# Patient Record
Sex: MALE | Race: WHITE | NOT HISPANIC OR LATINO | Employment: FULL TIME | URBAN - METROPOLITAN AREA
[De-identification: names, ages, dates, MRNs, and addresses within clinical notes are randomized per-mention and may not be internally consistent; named-entity substitution may affect disease eponyms.]

---

## 2019-10-10 ENCOUNTER — TELEPHONE (OUTPATIENT)
Dept: ORTHOPEDICS | Facility: CLINIC | Age: 39
End: 2019-10-10

## 2019-10-10 NOTE — TELEPHONE ENCOUNTER
spoke w/ patient discussing upcoming appt. Patient states that he wanted to be put on waitlist to get an earlier appt. Informed patient that if a slot opened up he would be called to get an earlier appt. Patient verbalized understanding and was grateful for the call. -DD          ----- Message from Lanny Licona sent at 10/10/2019  8:31 AM CDT -----  Contact: Patient  Patient would like a call back at Ph .[phone], to discuss upcoming appointment.

## 2019-10-22 DIAGNOSIS — M25.551 RIGHT HIP PAIN: Primary | ICD-10-CM

## 2019-10-22 DIAGNOSIS — M25.561 RIGHT KNEE PAIN, UNSPECIFIED CHRONICITY: ICD-10-CM

## 2019-10-24 ENCOUNTER — TELEPHONE (OUTPATIENT)
Dept: SPORTS MEDICINE | Facility: CLINIC | Age: 39
End: 2019-10-24

## 2019-10-24 NOTE — TELEPHONE ENCOUNTER
Pt has appt with Dr. Stiles 10-28-19, he will not be available. Pt rescheduled to 11-6-19 knows to come in early for x-rays

## 2019-11-06 ENCOUNTER — HOSPITAL ENCOUNTER (OUTPATIENT)
Dept: RADIOLOGY | Facility: HOSPITAL | Age: 39
Discharge: HOME | End: 2019-11-06
Attending: ORTHOPAEDIC SURGERY
Payer: COMMERCIAL

## 2019-11-06 ENCOUNTER — OFFICE VISIT (OUTPATIENT)
Dept: SPORTS MEDICINE | Facility: CLINIC | Age: 39
End: 2019-11-06
Payer: COMMERCIAL

## 2019-11-06 ENCOUNTER — TELEPHONE (OUTPATIENT)
Dept: ORTHOPEDICS | Facility: CLINIC | Age: 39
End: 2019-11-06

## 2019-11-06 VITALS
BODY MASS INDEX: 29.16 KG/M2 | HEART RATE: 54 BPM | WEIGHT: 220 LBS | DIASTOLIC BLOOD PRESSURE: 76 MMHG | SYSTOLIC BLOOD PRESSURE: 129 MMHG | HEIGHT: 73 IN

## 2019-11-06 DIAGNOSIS — M25.859 FEMORAL ACETABULAR IMPINGEMENT: ICD-10-CM

## 2019-11-06 DIAGNOSIS — M25.561 RIGHT KNEE PAIN, UNSPECIFIED CHRONICITY: ICD-10-CM

## 2019-11-06 DIAGNOSIS — M25.562 LEFT KNEE PAIN, UNSPECIFIED CHRONICITY: Primary | ICD-10-CM

## 2019-11-06 DIAGNOSIS — M25.551 RIGHT HIP PAIN: ICD-10-CM

## 2019-11-06 DIAGNOSIS — Z98.890 HISTORY OF HIP SURGERY: ICD-10-CM

## 2019-11-06 DIAGNOSIS — Z98.890 S/P ARTHROSCOPIC SURGERY OF LEFT KNEE: ICD-10-CM

## 2019-11-06 DIAGNOSIS — S83.242A ACUTE MEDIAL MENISCUS TEAR OF LEFT KNEE, INITIAL ENCOUNTER: ICD-10-CM

## 2019-11-06 PROCEDURE — 73502 XR HIP 2 VIEW RIGHT: ICD-10-PCS | Mod: 26,RT,, | Performed by: RADIOLOGY

## 2019-11-06 PROCEDURE — 99203 OFFICE O/P NEW LOW 30 MIN: CPT | Mod: S$PBB,,, | Performed by: ORTHOPAEDIC SURGERY

## 2019-11-06 PROCEDURE — 73564 X-RAY EXAM KNEE 4 OR MORE: CPT | Mod: 26,LT,, | Performed by: RADIOLOGY

## 2019-11-06 PROCEDURE — 99203 PR OFFICE/OUTPT VISIT, NEW, LEVL III, 30-44 MIN: ICD-10-PCS | Mod: S$PBB,,, | Performed by: ORTHOPAEDIC SURGERY

## 2019-11-06 PROCEDURE — 99999 PR PBB SHADOW E&M-EST. PATIENT-LVL IV: CPT | Mod: PBBFAC,,, | Performed by: ORTHOPAEDIC SURGERY

## 2019-11-06 PROCEDURE — 73562 X-RAY EXAM OF KNEE 3: CPT | Mod: TC,RT

## 2019-11-06 PROCEDURE — 99999 PR PBB SHADOW E&M-EST. PATIENT-LVL IV: ICD-10-PCS | Mod: PBBFAC,,, | Performed by: ORTHOPAEDIC SURGERY

## 2019-11-06 PROCEDURE — 73502 X-RAY EXAM HIP UNI 2-3 VIEWS: CPT | Mod: 26,RT,, | Performed by: RADIOLOGY

## 2019-11-06 PROCEDURE — 73562 X-RAY EXAM OF KNEE 3: CPT | Mod: 26,59,RT, | Performed by: RADIOLOGY

## 2019-11-06 PROCEDURE — 73502 X-RAY EXAM HIP UNI 2-3 VIEWS: CPT | Mod: TC,RT

## 2019-11-06 PROCEDURE — 73564 XR KNEE ORTHO LEFT WITH FLEXION: ICD-10-PCS | Mod: 26,LT,, | Performed by: RADIOLOGY

## 2019-11-06 PROCEDURE — 73562 XR KNEE ORTHO LEFT WITH FLEXION: ICD-10-PCS | Mod: 26,59,RT, | Performed by: RADIOLOGY

## 2019-11-06 PROCEDURE — 99214 OFFICE O/P EST MOD 30 MIN: CPT | Mod: PBBFAC,25 | Performed by: ORTHOPAEDIC SURGERY

## 2019-11-06 NOTE — LETTER
November 6, 2019      [facility]  [address]  [place]s  [place] [id number]           [facility]  [address]  [place] [address]  Phone: [phone]  Fax: [phone]          Patient: Sanchez Oneal   MR Number: [id number]   YOB: 1980   Date of Visit: 11/6/2019       Dear [facility]:    Thank you for referring Sanchez Oneal to me for evaluation. Attached you will find relevant portions of my assessment and plan of care.    If you have questions, please do not hesitate to call me. I look forward to following Sanchez Oneal along with you.    Sincerely,    Trung Stiles MD    Enclosure  CC:  No Recipients    If you would like to receive this communication electronically, please contact [email or web address] or [phone] to request more information on [employer] Link access.    For providers and/or their staff who would like to refer a patient to [facility], please contact us through our one-stop-shop provider referral line, [facility] , at [phone].    If you feel you have received this communication in error or would no longer like to receive these types of communications, please e-mail [email or web address]

## 2019-11-06 NOTE — PROGRESS NOTES
Patient ID: Sanchez Oneal  YOB: 1980  MRN: [id number]    Referred By: Getachew Morley [employer]    Chief Complaint: Pain of the Right Hip and Pain of the Left Knee      History of Present Illness: 38 y.o. male  referred by Getachew Morley for bilateral hip (right greater than left) and left knee pain. Patient states that he had previous surgeries by outside physicians in [facility] on his right hip about a year ago, which sounds like a right arthroscopic labral debridement. He also had a surgery on his left knee for a torn meniscus, which sounds like a partial meniscectomy.  The records are not available today. Patient states that since both surgeries he has not had much improvement. He states that he has right hip pain with activities such as bending, turning, squatting, or sitting for a prolong period of time. Complains of sharp and dull pain in the right hip worse with external rotation. Most activities cause left knee pain. Denies fevers, chills, numbness, tingling, and groin numbness/ tingling. Denies loss of bowel or bladder.     Pt states he had hip sx about a year ago and then he had his knee done about 2 years ago   He was referred by Getachew Morley PT    Hip Pain    The pain is present in the right hip. The current episode started more than 1 year ago. The problem occurs intermittently. The problem has been gradually worsening. The quality of the pain is described as aching. The pain is at a severity of 7/10. Pertinent negatives include no fever or itching. The symptoms are aggravated by activity, bearing weight, bending, walking and standing. He has tried cold, heat and NSAIDs for the symptoms. Physical therapy was effective.  Knee Pain    The pain is present in the left knee. The current episode started more than 1 year ago. The problem occurs intermittently. The problem has been gradually worsening. The quality of the pain is described as aching. The pain is at a  severity of 7/10. Pertinent negatives include no fever or itching. The symptoms are aggravated by activity, bearing weight, bending, walking and standing. He has tried cold, heat and NSAIDs for the symptoms. Physical therapy was effective.      Past Medical History:   No past medical history on file.  Past Surgical History:   Procedure Laterality Date    HIP SURGERY Right 2018    KNEE ARTHROSCOPY Left 2018     No family history on file.  Social History     Socioeconomic History    Marital status:      Spouse name: Not on file    Number of children: Not on file    Years of education: Not on file    Highest education level: Not on file   Occupational History    Not on file   Social Needs    Financial resource strain: Not on file    Food insecurity:     Worry: Not on file     Inability: Not on file    Transportation needs:     Medical: Not on file     Non-medical: Not on file   Tobacco Use    Smoking status: Never Smoker    Smokeless tobacco: Never Used   Substance and Sexual Activity    Alcohol use: Never     Frequency: Never    Drug use: Never    Sexual activity: Not on file   Lifestyle    Physical activity:     Days per week: Not on file     Minutes per session: Not on file    Stress: Not on file   Relationships    Social connections:     Talks on phone: Not on file     Gets together: Not on file     Attends Gnosticist service: Not on file     Active member of club or organization: Not on file     Attends meetings of clubs or organizations: Not on file     Relationship status: Not on file   Other Topics Concern    Not on file   Social History Narrative    Not on file       Review of patient's allergies indicates:  No Known Allergies  Review of Systems   Constitution: Negative for chills and fever.   HENT: Negative for sore throat.    Eyes: Negative for blurred vision.   Cardiovascular: Negative for dyspnea on exertion.   Respiratory: Negative for shortness of breath.     Hematologic/Lymphatic: Does not bruise/bleed easily.   Skin: Negative for itching.   Gastrointestinal: Negative for vomiting.   Genitourinary: Negative for dysuria.   Neurological: Negative for dizziness.   Psychiatric/Behavioral: The patient does not have insomnia.        Physical Exam:   Body mass index is 29.03 kg/m².  Vitals:    19 0806   BP: 129/76   Pulse: (!) 54        General    Nursing note and vitals reviewed.  Constitutional: He is oriented to person, place, and time. He appears well-developed and well-nourished.   HENT:   Head: Normocephalic and atraumatic.   Eyes: EOM are normal.   Neck: Normal range of motion.   Cardiovascular: Normal rate.    Pulmonary/Chest: Effort normal.   Neurological: He is alert and oriented to person, place, and time.   Psychiatric: He has a normal mood and affect.           Right Knee Exam     Range of Motion   Extension: -5   Flexion: 120     Tests   Ligament Examination Lachman: normal (-1 to 2mm)   MCL - Valgus: normal (0 to 2mm)  LCL - Varus: normal  Patella   Patellar apprehension: negative    Left Knee Exam     Tenderness   The patient tender to palpation of the medial joint line.    Range of Motion   Extension: -5   Flexion: 120     Tests   Stability Lachman: normal (-1 to 2mm)   MCL - Valgus: normal (0 to 2mm)  LCL - Varus: normal (0 to 2mm)  Patella   Patellar apprehension: negative    Right Hip Exam     Range of Motion   Extension: 0   Flexion: 110   Right hip external rotation: 75.   Internal rotation: 5     Tests   Pain w/ forced internal rotation (CONNOR): present    Other   Sensation: normal    Comments:  Positive impingement testing  Left Hip Exam     Range of Motion   Extension: 0   Flexion: 110   External rotation: 80   Internal rotation: 5     Other   Sensation: normal          Muscle Strength   Right Lower Extremity   Hip Abduction: 5/5   Hip Adduction: 5/5   Hip Flexion: 5/5   Quadriceps:  5/5   Hamstrin/5   Ankle Dorsiflexion:  5/5   Left Lower  Extremity   Hip Abduction: 5/5   Hip Adduction: 5/5   Hip Flexion: 5/5   Quadriceps:  5/5   Hamstrin/5   Ankle Dorsiflexion:  5/5     Vascular Exam     Right Pulses  Dorsalis Pedis:      2+  Posterior Tibial:      2+        Left Pulses  Dorsalis Pedis:      2+  Posterior Tibial:      2+        All compartments are soft and compressible.   Calf soft non-tender. Intact EHL, FHL, gastroc soleus, and tibialis anterior.   Sensation intact to light touch in superficial peroneal, deep peroneal, tibial, sural, and saphenous nerve distributions.   Foot warm and well perfused with capillary refill of less than 2 seconds and palpable pedal pulses.       Imaging:    X-ray Knee Ortho Left with Flexion  Narrative: EXAMINATION:  XR KNEE ORTHO LEFT WITH FLEXION    CLINICAL HISTORY:  Pain in right knee    TECHNIQUE:  AP standing views of both knees, AP flexion views of both knees, lateral view of the left knee and Merchant views of both knees    COMPARISON:  None    FINDINGS:  The joint spaces of all 3 compartments of the left knee are well maintained.  The medial and lateral compartment joint spaces of the right knee are also well maintained.  No joint effusion.  No acute fracture or dislocation.  Impression: 1.  As above    Electronically signed by: Cricket Nassar DO  Date:    2019  Time:    08:31  X-Ray Hip 2 or 3 views Right  Narrative: EXAMINATION:  XR HIP 2 VIEW RIGHT    CLINICAL HISTORY:  Pain in right hip    TECHNIQUE:  AP view of the pelvis and frog leg lateral view of the right hip were performed.    COMPARISON:  None    FINDINGS:  No acute fracture or dislocation.  The joint space appears to be well preserved.  Pelvic phleboliths are noted.  Impression: 1.  As above    Electronically signed by: Cricket Nassar DO  Date:    2019  Time:    08:31      Images reviewed shows right hip CAM LESION.    Relevant imaging results reviewed and interpreted by me, discussed with the patient and / or family today.      Other Tests:     No other tests performed today.    Assessment:  38 y.o. male  referred by Getachew Morley for right hip pain and left knee pain.  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    Acute medial meniscus tear of left knee, initial encounter     Left knee pain, unspecified chronicity Yes    Femoral acetabular impingement     Right hip pain     S/P arthroscopic surgery of left knee     History of hip surgery         Plan:   Continue physical therapy as he is having some slight improvement with his hip and I think it will continue to improve after joint injection.   I think that most of his hip symptoms are coming from RANGEL with an obvious Cam lesion of his right hip.  We discussed RANGEL different treatment options for that.   Regarding his left knee I think he has likely meniscal deficiency and possibly new meniscus tear. He has failed conservative management for both.   MRI Left knee to evaluate for new meniscus tear   Discussed Ultrasound guided Right Hip CSI injection. We will plan to do this at the next visit.   [facility]    INB discussed possible chandler 3D CT imaging reconstruction of right hip.    Treatment plan was developed with input from the patient/family, and they expressed understanding and agreement with the plan. All questions were answered today.    Follow-up: To review images and Right Hip US CSI or sooner if there are any problems between now and then.    Disclaimer: This note was prepared using a voice recognition system and is likely to have sound alike errors within the text.

## 2019-11-08 ENCOUNTER — TELEPHONE (OUTPATIENT)
Dept: ORTHOPEDICS | Facility: CLINIC | Age: 39
End: 2019-11-08

## 2019-11-08 ENCOUNTER — PATIENT MESSAGE (OUTPATIENT)
Dept: SPORTS MEDICINE | Facility: CLINIC | Age: 39
End: 2019-11-08

## 2019-11-08 NOTE — TELEPHONE ENCOUNTER
Spoke w/ patient in regards to r/s his MRI. Patient is scheduled for 11/18/2019 @6:15pm Patient is r/s w/ Dr. Stiles for 11/21/2019 @1:00pm

## 2019-11-15 ENCOUNTER — TELEPHONE (OUTPATIENT)
Dept: RADIOLOGY | Facility: HOSPITAL | Age: 39
End: 2019-11-15

## 2019-11-18 ENCOUNTER — HOSPITAL ENCOUNTER (OUTPATIENT)
Dept: RADIOLOGY | Facility: HOSPITAL | Age: 39
Discharge: HOME | End: 2019-11-18
Attending: ORTHOPAEDIC SURGERY
Payer: COMMERCIAL

## 2019-11-18 ENCOUNTER — TELEPHONE (OUTPATIENT)
Dept: SPORTS MEDICINE | Facility: CLINIC | Age: 39
End: 2019-11-18

## 2019-11-18 DIAGNOSIS — M25.562 LEFT KNEE PAIN, UNSPECIFIED CHRONICITY: ICD-10-CM

## 2019-11-18 PROCEDURE — 73721 MRI KNEE WITHOUT CONTRAST LEFT: ICD-10-PCS | Mod: 26,LT,, | Performed by: RADIOLOGY

## 2019-11-18 PROCEDURE — 73721 MRI JNT OF LWR EXTRE W/O DYE: CPT | Mod: 26,LT,, | Performed by: RADIOLOGY

## 2019-11-18 PROCEDURE — 73721 MRI JNT OF LWR EXTRE W/O DYE: CPT | Mod: TC,LT

## 2019-11-20 ENCOUNTER — TELEPHONE (OUTPATIENT)
Dept: SPORTS MEDICINE | Facility: CLINIC | Age: 39
End: 2019-11-20

## 2019-11-20 NOTE — TELEPHONE ENCOUNTER
Called patient to let him know that we would not be able to do his US injection tomorrow due to not having the US machine and we will have to reschedule to either Monday or Wednesday, but we could do the MRI review if he wanted to keep his appointment. Patient stated understanding and wanted to reschedule the MRI review with the US injection. Patient is rescheduling to Monday 11/25 at 8:20.

## 2019-11-25 ENCOUNTER — LAB VISIT (OUTPATIENT)
Dept: LAB | Facility: HOSPITAL | Age: 39
End: 2019-11-25
Attending: PHYSICIAN ASSISTANT
Payer: COMMERCIAL

## 2019-11-25 ENCOUNTER — OFFICE VISIT (OUTPATIENT)
Dept: SPORTS MEDICINE | Facility: CLINIC | Age: 39
End: 2019-11-25
Payer: COMMERCIAL

## 2019-11-25 VITALS
SYSTOLIC BLOOD PRESSURE: 128 MMHG | WEIGHT: 220 LBS | BODY MASS INDEX: 29.16 KG/M2 | DIASTOLIC BLOOD PRESSURE: 76 MMHG | HEIGHT: 73 IN | HEART RATE: 61 BPM

## 2019-11-25 DIAGNOSIS — M25.859 FEMORAL ACETABULAR IMPINGEMENT: ICD-10-CM

## 2019-11-25 DIAGNOSIS — M25.562 LEFT KNEE PAIN, UNSPECIFIED CHRONICITY: ICD-10-CM

## 2019-11-25 DIAGNOSIS — M84.30XA STRESS REACTION OF BONE: ICD-10-CM

## 2019-11-25 DIAGNOSIS — M25.551 RIGHT HIP PAIN: ICD-10-CM

## 2019-11-25 DIAGNOSIS — S83.242A ACUTE MEDIAL MENISCUS TEAR OF LEFT KNEE, INITIAL ENCOUNTER: ICD-10-CM

## 2019-11-25 DIAGNOSIS — S83.242A ACUTE MEDIAL MENISCUS TEAR OF LEFT KNEE, INITIAL ENCOUNTER: Primary | ICD-10-CM

## 2019-11-25 LAB
25(OH)D3+25(OH)D2 SERPL-MCNC: 37 NG/ML (ref 30–96)
ALP SERPL-CCNC: 97 U/L (ref 55–135)
CALCIUM SERPL-MCNC: 10.1 MG/DL (ref 8.7–10.5)
CORTIS SERPL-MCNC: 7.8 UG/DL
DHEA-S SERPL-MCNC: 96.8 UG/DL (ref 139.7–484.4)
ESTRADIOL SERPL-MCNC: 14 PG/ML (ref 11–44)
FSH SERPL-ACNC: 4.9 MIU/ML (ref 0.95–11.95)
LH SERPL-ACNC: 2.5 MIU/ML (ref 0.6–12.1)
MAGNESIUM SERPL-MCNC: 1.9 MG/DL (ref 1.6–2.6)
PHOSPHATE SERPL-MCNC: 2.4 MG/DL (ref 2.7–4.5)
PROLACTIN SERPL IA-MCNC: 7.1 NG/ML (ref 3.5–19.4)
PTH-INTACT SERPL-MCNC: 45 PG/ML (ref 9–77)
TESTOST SERPL-MCNC: 333 NG/DL (ref 304–1227)
TSH SERPL DL<=0.005 MIU/L-ACNC: 1.78 UIU/ML (ref 0.4–4)

## 2019-11-25 PROCEDURE — 84146 ASSAY OF PROLACTIN: CPT

## 2019-11-25 PROCEDURE — 82024 ASSAY OF ACTH: CPT

## 2019-11-25 PROCEDURE — 84305 ASSAY OF SOMATOMEDIN: CPT

## 2019-11-25 PROCEDURE — 82672 ASSAY OF ESTROGEN: CPT

## 2019-11-25 PROCEDURE — 20611 LARGE JOINT ASPIRATION/INJECTION: R HIP JOINT: ICD-10-PCS | Mod: S$PBB,RT,, | Performed by: ORTHOPAEDIC SURGERY

## 2019-11-25 PROCEDURE — 83735 ASSAY OF MAGNESIUM: CPT

## 2019-11-25 PROCEDURE — 83970 ASSAY OF PARATHORMONE: CPT

## 2019-11-25 PROCEDURE — 82306 VITAMIN D 25 HYDROXY: CPT

## 2019-11-25 PROCEDURE — 84443 ASSAY THYROID STIM HORMONE: CPT

## 2019-11-25 PROCEDURE — 82310 ASSAY OF CALCIUM: CPT

## 2019-11-25 PROCEDURE — 84100 ASSAY OF PHOSPHORUS: CPT

## 2019-11-25 PROCEDURE — 83001 ASSAY OF GONADOTROPIN (FSH): CPT

## 2019-11-25 PROCEDURE — 84403 ASSAY OF TOTAL TESTOSTERONE: CPT

## 2019-11-25 PROCEDURE — 99213 PR OFFICE/OUTPT VISIT, EST, LEVL III, 20-29 MIN: ICD-10-PCS | Mod: 25,S$PBB,, | Performed by: ORTHOPAEDIC SURGERY

## 2019-11-25 PROCEDURE — 99213 OFFICE O/P EST LOW 20 MIN: CPT | Mod: 25,S$PBB,, | Performed by: ORTHOPAEDIC SURGERY

## 2019-11-25 PROCEDURE — 82670 ASSAY OF TOTAL ESTRADIOL: CPT

## 2019-11-25 PROCEDURE — 99999 PR PBB SHADOW E&M-EST. PATIENT-LVL III: ICD-10-PCS | Mod: PBBFAC,,, | Performed by: ORTHOPAEDIC SURGERY

## 2019-11-25 PROCEDURE — 99999 PR PBB SHADOW E&M-EST. PATIENT-LVL III: CPT | Mod: PBBFAC,,, | Performed by: ORTHOPAEDIC SURGERY

## 2019-11-25 PROCEDURE — 82533 TOTAL CORTISOL: CPT

## 2019-11-25 PROCEDURE — 83003 ASSAY GROWTH HORMONE (HGH): CPT

## 2019-11-25 PROCEDURE — 82627 DEHYDROEPIANDROSTERONE: CPT

## 2019-11-25 PROCEDURE — 20611 DRAIN/INJ JOINT/BURSA W/US: CPT | Mod: PBBFAC | Performed by: ORTHOPAEDIC SURGERY

## 2019-11-25 PROCEDURE — 83002 ASSAY OF GONADOTROPIN (LH): CPT

## 2019-11-25 PROCEDURE — 99213 OFFICE O/P EST LOW 20 MIN: CPT | Mod: PBBFAC | Performed by: ORTHOPAEDIC SURGERY

## 2019-11-25 PROCEDURE — 84402 ASSAY OF FREE TESTOSTERONE: CPT

## 2019-11-25 PROCEDURE — 36415 COLL VENOUS BLD VENIPUNCTURE: CPT

## 2019-11-25 PROCEDURE — 84075 ASSAY ALKALINE PHOSPHATASE: CPT

## 2019-11-25 RX ORDER — METHYLPREDNISOLONE ACETATE 80 MG/ML
80 INJECTION, SUSPENSION INTRA-ARTICULAR; INTRALESIONAL; INTRAMUSCULAR; SOFT TISSUE
Status: DISCONTINUED | OUTPATIENT
Start: 2019-11-25 | End: 2019-11-25 | Stop reason: HOSPADM

## 2019-11-25 RX ADMIN — METHYLPREDNISOLONE ACETATE 80 MG: 80 INJECTION, SUSPENSION INTRALESIONAL; INTRAMUSCULAR; INTRASYNOVIAL; SOFT TISSUE at 08:11

## 2019-11-25 NOTE — PATIENT INSTRUCTIONS
- no lower body impact activities or lifting for for LLE  - stress fracture labs  - pool exercises on his own  - f/u 6 weeks to check results of right hip injection and left knee protocol

## 2019-11-25 NOTE — LETTER
November 25, 2019      [facility]  [address] [address]           [facility]  [address] [address]  Phone: [phone]  Fax: [phone]          Patient: Sanchez Oneal   MR Number: [id number]   YOB: 1980   Date of Visit: 11/25/2019       Dear [facility]:    Thank you for referring Sanchez Oneal to me for evaluation. Attached you will find relevant portions of my assessment and plan of care.    If you have questions, please do not hesitate to call me. I look forward to following Sanchez Oneal along with you.    Sincerely,    Trung Stiles MD    Enclosure  CC:  No Recipients    If you would like to receive this communication electronically, please contact [email or web address] or [phone] to request more information on [employer] Link access.    For providers and/or their staff who would like to refer a patient to [facility], please contact us through our one-stop-shop provider referral line, [facility], at [phone].    If you feel you have received this communication in error or would no longer like to receive these types of communications, please e-mail [email or web address]

## 2019-11-25 NOTE — PROGRESS NOTES
Patient ID: Sanchez Oneal  YOB: 1980  MRN: [id number]    Chief Complaint: Pain of the Left Knee and Pain of the Right Hip    Referred By:Getachew Morley    History of Present Illness: Sanchez Oneal is a 39 y.o. male here to follow-up results of his left knee MRI and also here for a right hip ultrasound-guided injection.  Symptoms are unchanged    Knee Pain    The pain is present in the left knee. The problem occurs intermittently. The problem has been unchanged. The quality of the pain is described as sharp, aching and throbbing. The pain is at a severity of 3/10. Associated symptoms include joint swelling. Pertinent negatives include no fever or itching. The symptoms are aggravated by exercise, twisting, bending, activity and sitting (jumping, jogging). Physical therapy: Getachew Morley.       Previous 11/6/2019 History of Present Illness: 38 y.o. male  referred by Getachew Morley for bilateral hip (right greater than left) and left knee pain. Patient states that he had previous surgeries by outside physicians in [facility] on his right hip about a year ago, which sounds like a right arthroscopic labral debridement. He also had a surgery on his left knee for a torn meniscus, which sounds like a partial meniscectomy.  The records are not available today. Patient states that since both surgeries he has not had much improvement. He states that he has right hip pain with activities such as bending, turning, squatting, or sitting for a prolong period of time. Complains of sharp and dull pain in the right hip worse with external rotation. Most activities cause left knee pain. Denies fevers, chills, numbness, tingling, and groin numbness/ tingling. Denies loss of bowel or bladder.     Past Medical History:   History reviewed. No pertinent past medical history.  Past Surgical History:   Procedure Laterality Date    HIP SURGERY Right 2018    KNEE ARTHROSCOPY Left 2018     History reviewed. No  pertinent family history.  Social History     Socioeconomic History    Marital status:      Spouse name: Not on file    Number of children: Not on file    Years of education: Not on file    Highest education level: Not on file   Occupational History    Not on file   Social Needs    Financial resource strain: Not on file    Food insecurity:     Worry: Not on file     Inability: Not on file    Transportation needs:     Medical: Not on file     Non-medical: Not on file   Tobacco Use    Smoking status: Never Smoker    Smokeless tobacco: Never Used   Substance and Sexual Activity    Alcohol use: Never     Frequency: Never    Drug use: Never    Sexual activity: Not on file   Lifestyle    Physical activity:     Days per week: Not on file     Minutes per session: Not on file    Stress: Not on file   Relationships    Social connections:     Talks on phone: Not on file     Gets together: Not on file     Attends Mandaen service: Not on file     Active member of club or organization: Not on file     Attends meetings of clubs or organizations: Not on file     Relationship status: Not on file   Other Topics Concern    Not on file   Social History Narrative    Not on file       Review of patient's allergies indicates:  No Known Allergies  Review of Systems   Constitution: Negative for fever.   HENT: Negative for sore throat.    Eyes: Negative for blurred vision.   Cardiovascular: Negative for dyspnea on exertion.   Respiratory: Negative for shortness of breath.    Hematologic/Lymphatic: Does not bruise/bleed easily.   Skin: Negative for itching.   Gastrointestinal: Negative for vomiting.   Genitourinary: Negative for dysuria.   Neurological: Negative for dizziness.   Psychiatric/Behavioral: The patient does not have insomnia.        Physical Exam:   Body mass index is 29.03 kg/m².  Vitals:    11/25/19 0849   BP: 128/76   Pulse: 61        Ortho/SPM Exam  GENERAL: Well appearing, appropriate for stated  age, no acute distress.  PULMONARY: Respirations are even and non-labored.  NEURO: Awake, alert, and oriented x 3.  PSYCH: Mood & affect are appropriate.  HEENT: Head is normocephalic and atraumatic.    Imaging:    MRI Knee Without Contrast Left  Narrative: EXAMINATION:  MRI KNEE WITHOUT CONTRAST LEFT    CLINICAL HISTORY:  left knee pain;Pain in left knee    TECHNIQUE:  Multiplanar, multisequence images were preformed about the knee.    COMPARISON:  None    FINDINGS:  Menisci:  The body of the lateral meniscus measures approximately 16 mm in width, consistent with a discoid meniscus.  The medial meniscus is intact.    Cruciate ligaments: No tear is seen. There is no evidence of mucoid degeneration.    Collateral ligaments: The medial and lateral collateral ligaments are intact.    Extensor mechanism: No tear is seen.    Cartilage: No cartilaginous defect is demonstrated.    Marrow: No signal abnormality is present to suggest a marrow replacement process.No fracture.    Joint fluid and synovium: There is no effusion. No synovial abnormality is seen.  Impression: 1. No evidence to suggest a meniscal tear or significant meniscal deficiency.  Discoid lateral meniscus noted.    Electronically signed by: Cricket Nassar DO  Date:    11/19/2019  Time:    08:51    He also has some bone marrow edema and a horizontal pattern the anterior tibial plateau that does parallel the physeal scar but could represent a stress reaction. I spoke with the radiologist and we discussed these findings.  These could be consistent with a stress reaction could be marrow changes related to his physeal scar.  I discussed these with the patient.    Relevant imaging results reviewed and interpreted by me, discussed with the patient and / or family today.     Other Tests:     No other tests performed today.    Assessment:  Sanchez Oneal is a 39 y.o. male with right hip pain status post hip arthroscopy by outside surgeon and left knee pain status  post meniscal debridement by outside physician    Encounter Diagnoses   Name Primary?    Acute medial meniscus tear of left knee, initial encounter Yes    Stress reaction of bone     Left knee pain, unspecified chronicity     Femoral acetabular impingement     Right hip pain         Plan:   Stress fracture labs   Limit impact activities.  No squatting or left lower extremity lifting at this point.   Right hip injection   Continue PT   Full exercises on his own   Treatment plan was developed with input from the patient/family, and they expressed understanding and agreement with the plan. All questions were answered today.    Follow-up:  6 weeks or sooner if there are any problems between now and then.    Disclaimer: This note was prepared using a voice recognition system and is likely to have sound alike errors within the text.

## 2019-11-25 NOTE — PROCEDURES
Large Joint Aspiration/Injection: R hip joint  Date/Time: 11/25/2019 8:40 AM  Performed by: Trung Stiles MD  Authorized by: Trung Stiels MD     Consent Done?:  Yes (Verbal)  Indications:  Pain  Procedure site marked: Yes    Timeout: Prior to procedure the correct patient, procedure, and site was verified    Anesthesia  Local anesthesia used  Anesthesia: local infiltration  Anesthetic: bupivacaine 0.25% without epinephrine, lidocaine 1% without epinephrine and topical anesthetic  Anesthetic total: 4mL    Location:  Hip  Site:  R hip joint  Prep: Patient was prepped and draped in usual sterile fashion    Needle size:  20 G  Ultrasonic Guidance for needle placement: Yes  Images are saved and documented.  Medications:  80 mg methylPREDNISolone acetate 80 mg/mL  Patient tolerance:  Patient tolerated the procedure well with no immediate complications    Additional Comments: Ultrasound Guided Right Hip Injection  2cc 1% lidocaine plain, 2cc 0.5% marcaine plain, 1cc 80mg methylprednisolone

## 2019-11-26 DIAGNOSIS — R89.9 ABNORMAL LABORATORY TEST: ICD-10-CM

## 2019-11-26 DIAGNOSIS — M84.30XA STRESS REACTION OF BONE: Primary | ICD-10-CM

## 2019-11-26 LAB
ACTH PLAS-MCNC: 13 PG/ML (ref 0–46)
GH SERPL-MCNC: 0.1 NG/ML (ref 0–3)

## 2019-11-27 LAB
ESTROGEN SERPL-MCNC: 115 PG/ML
IGF-I SERPL-MCNC: 193 NG/ML (ref 48–292)
IGF-I Z-SCORE SERPL: 0.84 SD

## 2019-11-28 LAB — TESTOST FREE SERPL-MCNC: 5.2 PG/ML (ref 5.1–41.5)

## 2019-12-03 ENCOUNTER — TELEPHONE (OUTPATIENT)
Dept: ORTHOPEDICS | Facility: CLINIC | Age: 39
End: 2019-12-03

## 2019-12-04 ENCOUNTER — TELEPHONE (OUTPATIENT)
Dept: ORTHOPEDICS | Facility: CLINIC | Age: 39
End: 2019-12-04

## 2019-12-04 NOTE — TELEPHONE ENCOUNTER
Left message for patient that I was putting in his excuse from his last visit and that it will be available to him through his portal and if he had any other questions to let me know.      ----- Message from Juan José Fuentes sent at 12/4/2019  2:36 PM CST -----  Contact: patient  Patient is requesting a letter from Dr Stiles from his last visit, please contact patient at the above # concerning the letter

## 2019-12-06 ENCOUNTER — TELEPHONE (OUTPATIENT)
Dept: ORTHOPEDICS | Facility: CLINIC | Age: 39
End: 2019-12-06

## 2019-12-06 NOTE — TELEPHONE ENCOUNTER
Called patient to let him know that a referral was put in regarding lab results. Patient stated understanding.    Patient then asked if there was anything in the chart about Dr. Stiles wanting his xrays read by a radiologist. I told the patient that I did not see anything in his chart but that I could ask Dr. Stiles or Pinky on Monday as they were both out of the office today. Patient stated understanding.     Patient also asked about [facility] he had [facility]. I let the patient know that she was an early person today so she was already gone but that I could also gone so I could ask her on monday as well. Patient stated understanding.        ----- Message from Pinky Pina PA-C sent at 12/5/2019  7:36 AM CST -----  Please call the patient regarding his abnormal result. We are referring him to endocrinology regarding abnormal results. Referral has already been sent

## 2019-12-20 ENCOUNTER — TELEPHONE (OUTPATIENT)
Dept: SPORTS MEDICINE | Facility: CLINIC | Age: 39
End: 2019-12-20

## 2019-12-20 NOTE — TELEPHONE ENCOUNTER
Attempted to return patient's call. No answer, left voice message to call us back.    ----- Message from Debra Campos sent at 12/20/2019  9:30 AM CST -----  Contact: patient  Type:  Patient Returning Call    Who Called:patient  Who Left Message for Patient:nurse  Does the patient know what this is regarding?:endo/appt  Would the patient rather a call back or a response via [facility]? call  Best Call Back Number:[phone]  Additional Information: please call patient today

## 2020-01-06 ENCOUNTER — OFFICE VISIT (OUTPATIENT)
Dept: SPORTS MEDICINE | Facility: CLINIC | Age: 40
End: 2020-01-06
Payer: COMMERCIAL

## 2020-01-06 VITALS
HEART RATE: 57 BPM | SYSTOLIC BLOOD PRESSURE: 122 MMHG | WEIGHT: 220 LBS | DIASTOLIC BLOOD PRESSURE: 78 MMHG | HEIGHT: 73 IN | BODY MASS INDEX: 29.16 KG/M2

## 2020-01-06 DIAGNOSIS — Z98.890 HISTORY OF HIP SURGERY: ICD-10-CM

## 2020-01-06 DIAGNOSIS — R89.9 ABNORMAL LABORATORY TEST: ICD-10-CM

## 2020-01-06 DIAGNOSIS — Z98.890 S/P ARTHROSCOPIC SURGERY OF LEFT KNEE: ICD-10-CM

## 2020-01-06 DIAGNOSIS — M25.859 FEMORAL ACETABULAR IMPINGEMENT: ICD-10-CM

## 2020-01-06 DIAGNOSIS — M25.551 RIGHT HIP PAIN: Primary | ICD-10-CM

## 2020-01-06 DIAGNOSIS — M25.562 LEFT KNEE PAIN, UNSPECIFIED CHRONICITY: ICD-10-CM

## 2020-01-06 DIAGNOSIS — M84.30XA STRESS REACTION OF BONE: ICD-10-CM

## 2020-01-06 PROCEDURE — 99213 OFFICE O/P EST LOW 20 MIN: CPT | Mod: PBBFAC | Performed by: ORTHOPAEDIC SURGERY

## 2020-01-06 PROCEDURE — 99213 PR OFFICE/OUTPT VISIT, EST, LEVL III, 20-29 MIN: ICD-10-PCS | Mod: S$PBB,,, | Performed by: ORTHOPAEDIC SURGERY

## 2020-01-06 PROCEDURE — 99999 PR PBB SHADOW E&M-EST. PATIENT-LVL III: ICD-10-PCS | Mod: PBBFAC,,, | Performed by: ORTHOPAEDIC SURGERY

## 2020-01-06 PROCEDURE — 99999 PR PBB SHADOW E&M-EST. PATIENT-LVL III: CPT | Mod: PBBFAC,,, | Performed by: ORTHOPAEDIC SURGERY

## 2020-01-06 PROCEDURE — 99213 OFFICE O/P EST LOW 20 MIN: CPT | Mod: S$PBB,,, | Performed by: ORTHOPAEDIC SURGERY

## 2020-01-06 NOTE — PATIENT INSTRUCTIONS
· MRI arthrogram right hip (previous hip surgery)  · Will call results    Thank you for choosing [facility] and Dr. Trung Stiles for your orthopedic & sports medicine care. It is our goal to provide you with exceptional care that will help keep you healthy, active, and get you back in the game.    If you felt that you received exemplary care today, please consider leaving us feedback on Healthgrades at [email or web address].     Please do not hesitate to reach out to us via email, phone, or MyChart with any questions, concerns, or feedback.

## 2020-01-06 NOTE — PROGRESS NOTES
Patient ID: Sanchez Oneal  YOB: 1980  MRN: [id number]    Chief Complaint: Pain and Follow-up of the Right Hip    Referred By: Getachew Morley at [place]     History of Present Illness: Sanchez Oneal is a right-hand dominant 39 y.o. male  at [facility] here today for a follow up on right hip us guided injection on 11/25.  After the injection he had temporary partial relief which only lasted about 2 weeks. Patient states that most of the pain is with working out, bending over, which is described as a constant pain. He did have a previous surgery in 2018 on the right hip with an outside physician in [place] for a labral repair which he states was un-repairable. He has continued to have pain since the surgery. Denies fevers, chills, nightsweats, numbness and tingling.  At the last visit patient was here to review MRI of his left knee, in which he also had a nonunion lab panel done which showed a low the DHEA-sulfate level, the patient was referred to Endocrinology which he is not scheduled follow-up get.     (11/25)History of Present Illness: Sanchez Oneal is a 39 y.o. male here to follow-up results of his left knee MRI and also here for a right hip ultrasound-guided injection.  Symptoms are unchanged    6wk s/p US guided injection    Hip Pain    The pain is present in the right hip. The current episode started more than 1 month ago. The problem occurs intermittently. The problem has been unchanged. The quality of the pain is described as aching. The pain is at a severity of 6/10. Pertinent negatives include no fever or itching. The symptoms are aggravated by bearing weight, bending and activity. Physical therapy was effective.      Past Medical History:   History reviewed. No pertinent past medical history.  Past Surgical History:   Procedure Laterality Date    HIP SURGERY Right 2018    KNEE ARTHROSCOPY Left 2018     History reviewed. No pertinent family history.  Social History      Socioeconomic History    Marital status:      Spouse name: Not on file    Number of children: Not on file    Years of education: Not on file    Highest education level: Not on file   Occupational History    Not on file   Social Needs    Financial resource strain: Not on file    Food insecurity:     Worry: Not on file     Inability: Not on file    Transportation needs:     Medical: Not on file     Non-medical: Not on file   Tobacco Use    Smoking status: Never Smoker    Smokeless tobacco: Never Used   Substance and Sexual Activity    Alcohol use: Never     Frequency: Never    Drug use: Never    Sexual activity: Not on file   Lifestyle    Physical activity:     Days per week: Not on file     Minutes per session: Not on file    Stress: Not on file   Relationships    Social connections:     Talks on phone: Not on file     Gets together: Not on file     Attends Rastafarian service: Not on file     Active member of club or organization: Not on file     Attends meetings of clubs or organizations: Not on file     Relationship status: Not on file   Other Topics Concern    Not on file   Social History Narrative    Not on file       Review of patient's allergies indicates:  No Known Allergies  Review of Systems   Constitution: Negative for fever.   HENT: Negative for sore throat.    Eyes: Negative for blurred vision.   Cardiovascular: Negative for dyspnea on exertion.   Respiratory: Negative for shortness of breath.    Hematologic/Lymphatic: Does not bruise/bleed easily.   Skin: Negative for itching.   Musculoskeletal: Positive for joint pain and joint swelling.   Gastrointestinal: Negative for vomiting.   Genitourinary: Negative for dysuria.   Neurological: Negative for dizziness.   Psychiatric/Behavioral: The patient does not have insomnia.        Physical Exam:   Body mass index is 29.03 kg/m².  Vitals:    01/06/20 0942   BP: 122/78   Pulse: (!) 57        General    Nursing note and vitals  reviewed.  Constitutional: He is oriented to person, place, and time. He appears well-developed and well-nourished.   HENT:   Head: Normocephalic and atraumatic.   Eyes: EOM are normal.   Neck: Normal range of motion.   Cardiovascular: Normal rate.    Pulmonary/Chest: Effort normal.   Neurological: He is alert and oriented to person, place, and time.   Psychiatric: He has a normal mood and affect.           Right Knee Exam   Right knee exam is normal.    Range of Motion   The patient has normal right knee ROM.  Extension: 0   Flexion: 120     Tests   Ligament Examination Lachman: normal (-1 to 2mm) PCL-Posterior Drawer: normal (0 to 2mm)     MCL - Valgus: normal (0 to 2mm)  LCL - Varus: normal  Patella   Patellar apprehension: negative    Other   Sensation: normal    Left Knee Exam     Tenderness   The patient tender to palpation of the tibial tubercle and condyle.    Range of Motion   Extension: 5   Flexion:  120 (pain with flexion past 100) abnormal     Tests   Stability Lachman: normal (-1 to 2mm) PCL-Posterior Drawer: normal (0 to 2mm)  MCL - Valgus: normal (0 to 2mm)  LCL - Varus: normal (0 to 2mm)    Other   Sensation: normal    Right Hip Exam     Tests   Pain w/ forced internal rotation (CONNOR): present  Pain w/ forced external rotation (FADIR): present    Other   Sensation: normal    Comments:  Pain with impingement testing  Left Hip Exam   Left hip exam is normal.    Range of Motion   Extension: 0   Flexion: 110   External rotation: 60   Internal rotation: 20     Other   Sensation: normal          Muscle Strength   Right Lower Extremity   Hip Abduction: 5/5   Hip Adduction: 5/5   Hip Flexion: 5/5   Quadriceps:  5/5   Hamstrin/5   Ankle Dorsiflexion:  5/5   Left Lower Extremity   Hip Abduction: 5/5   Hip Adduction: 5/5   Hip Flexion: 5/5   Quadriceps:  5/5   Hamstrin/5   Ankle Dorsiflexion:  5/5     Vascular Exam     Right Pulses  Dorsalis Pedis:      2+  Posterior Tibial:      2+        Left  Pulses  Dorsalis Pedis:      2+  Posterior Tibial:      2+              Imaging:    MRI Knee Without Contrast Left  Narrative: EXAMINATION:  MRI KNEE WITHOUT CONTRAST LEFT    CLINICAL HISTORY:  left knee pain;Pain in left knee    TECHNIQUE:  Multiplanar, multisequence images were preformed about the knee.    COMPARISON:  None    FINDINGS:  Menisci:  The body of the lateral meniscus measures approximately 16 mm in width, consistent with a discoid meniscus.  The medial meniscus is intact.    Cruciate ligaments: No tear is seen. There is no evidence of mucoid degeneration.    Collateral ligaments: The medial and lateral collateral ligaments are intact.    Extensor mechanism: No tear is seen.    Cartilage: No cartilaginous defect is demonstrated.    Marrow: No signal abnormality is present to suggest a marrow replacement process.No fracture.    Joint fluid and synovium: There is no effusion. No synovial abnormality is seen.  Impression: 1. No evidence to suggest a meniscal tear or significant meniscal deficiency.  Discoid lateral meniscus noted.    Electronically signed by: Cricket Nassar DO  Date:    11/19/2019  Time:    08:51    No new imaging obtained at todays visit.   Relevant imaging results reviewed and interpreted by me, discussed with the patient and / or family today.     Other Tests:     No other tests performed today.     Assessment:  Sanchez Oneal is a 39 y.o. male follow-up:  Right hip ultrasound-guided steroid injection, and left knee pain      Left knee pain    Right hip pain   Right hip RANGEL   Right hip previous surgery by outside physician   Left knee previous surgery by an outside physician   Left knee stress reaction fracture    Encounter Diagnoses   Name Primary?    Right hip pain Yes    Femoral acetabular impingement     Stress reaction of bone     Abnormal laboratory test     S/P arthroscopic surgery of left knee     History of hip surgery     Left knee pain, unspecified chronicity          Plan:  · MRI arthrogram right hip (previous hip surgery)  · Will call with results   I discussed worrisome and red flag signs and symptoms with the patient. The patient expressed understanding and agreed to alert me immediately or to go to the emergency room if they experience any of these.    Treatment plan was developed with input from the patient/family, and they expressed understanding and agreement with the plan. All questions were answered today.    Follow-up:  2b decided or sooner if there are any problems between now and then.    Disclaimer: This note was prepared using a voice recognition system and is likely to have sound alike errors within the text.

## 2020-01-06 NOTE — LETTER
January 6, 2020      [facility]  [address] [address]           [facility]  [address] [address] [address]  Phone: [phone]  Fax: [phone]          Patient: Sanchez Oneal   MR Number: [id number]   YOB: 1980   Date of Visit: 1/6/2020       Dear [facility]:    Thank you for referring Sanchez Oneal to me for evaluation. Attached you will find relevant portions of my assessment and plan of care.    If you have questions, please do not hesitate to call me. I look forward to following Sanchez Oneal along with you.    Sincerely,    Trung Stiles MD    Enclosure  CC:  No Recipients    If you would like to receive this communication electronically, please contact [email or web address] or [phone] to request more information on [facility] access.    For providers and/or their staff who would like to refer a patient to [facility], please contact us through our one-stop-shop provider referral line, [facility], at [phone].    If you feel you have received this communication in error or would no longer like to receive these types of communications, please e-mail [email or web address]

## 2020-01-07 ENCOUNTER — TELEPHONE (OUTPATIENT)
Dept: ENDOCRINOLOGY | Facility: CLINIC | Age: 40
End: 2020-01-07

## 2020-01-07 ENCOUNTER — TELEPHONE (OUTPATIENT)
Dept: ORTHOPEDICS | Facility: CLINIC | Age: 40
End: 2020-01-07

## 2020-01-15 ENCOUNTER — TELEPHONE (OUTPATIENT)
Dept: ORTHOPEDICS | Facility: CLINIC | Age: 40
End: 2020-01-15

## 2020-01-15 DIAGNOSIS — Z01.812 PRE-PROCEDURE LAB EXAM: Primary | ICD-10-CM

## 2020-01-15 NOTE — TELEPHONE ENCOUNTER
Called patient to inform them of lab arrival time. Patient was unavailable so I left a detailed message rgarding the arrival time for the labs. MS

## 2020-01-16 ENCOUNTER — TELEPHONE (OUTPATIENT)
Dept: RADIOLOGY | Facility: HOSPITAL | Age: 40
End: 2020-01-16

## 2020-01-17 ENCOUNTER — TELEPHONE (OUTPATIENT)
Dept: ORTHOPEDICS | Facility: CLINIC | Age: 40
End: 2020-01-17

## 2020-01-17 ENCOUNTER — HOSPITAL ENCOUNTER (OUTPATIENT)
Dept: RADIOLOGY | Facility: HOSPITAL | Age: 40
Discharge: HOME | End: 2020-01-17
Attending: ORTHOPAEDIC SURGERY
Payer: COMMERCIAL

## 2020-01-17 DIAGNOSIS — M25.551 RIGHT HIP PAIN: ICD-10-CM

## 2020-01-17 PROCEDURE — 73722 MRI JOINT OF LWR EXTR W/DYE: CPT | Mod: TC,RT

## 2020-01-17 PROCEDURE — 25500020 PHARM REV CODE 255: Performed by: ORTHOPAEDIC SURGERY

## 2020-01-17 PROCEDURE — A9585 GADOBUTROL INJECTION: HCPCS | Performed by: ORTHOPAEDIC SURGERY

## 2020-01-17 PROCEDURE — 27093 INJECTION FOR HIP X-RAY: CPT

## 2020-01-17 RX ORDER — GADOBUTROL 604.72 MG/ML
0.2 INJECTION INTRAVENOUS
Status: COMPLETED | OUTPATIENT
Start: 2020-01-17 | End: 2020-01-17

## 2020-01-17 RX ADMIN — GADOBUTROL 0.2 ML: 604.72 INJECTION INTRAVENOUS at 09:01

## 2020-01-17 RX ADMIN — IOHEXOL 20 ML: 300 INJECTION, SOLUTION INTRAVENOUS at 09:01

## 2020-01-17 NOTE — TELEPHONE ENCOUNTER
Message below was sent to Juan José Fuentes on 1/17/2020 at 4:13pm via Skype, [place], LPN.       MRN:[id number], needs authorization through [facility] for Physical Therapy at another location that Dr. Stiles recommended. Pt gave phone number [phone] to contact them to report this. I'm guessing you already have this number. It looks like this should have been sent to you on 1/6/2020?

## 2020-01-23 ENCOUNTER — TELEPHONE (OUTPATIENT)
Dept: ORTHOPEDICS | Facility: CLINIC | Age: 40
End: 2020-01-23

## 2020-01-24 ENCOUNTER — PATIENT MESSAGE (OUTPATIENT)
Dept: ORTHOPEDICS | Facility: CLINIC | Age: 40
End: 2020-01-24

## 2020-01-24 ENCOUNTER — TELEPHONE (OUTPATIENT)
Dept: ORTHOPEDICS | Facility: CLINIC | Age: 40
End: 2020-01-24

## 2020-01-24 NOTE — TELEPHONE ENCOUNTER
I left a VM for patient to call us back regarding his results. I left a detailed message along with our call back number. MS

## 2020-01-24 NOTE — TELEPHONE ENCOUNTER
Made a second attempt to speak to patient.  Patient had a secure voicemail, so I left another detailed VM instructing patient to call our office back regarding his labs. MS

## 2020-01-28 ENCOUNTER — TELEPHONE (OUTPATIENT)
Dept: ORTHOPEDICS | Facility: CLINIC | Age: 40
End: 2020-01-28

## 2020-01-28 ENCOUNTER — PATIENT MESSAGE (OUTPATIENT)
Dept: ORTHOPEDICS | Facility: CLINIC | Age: 40
End: 2020-01-28

## 2020-01-28 NOTE — TELEPHONE ENCOUNTER
Attempted to return patient phone call regarding labs. Patient had a secure VM so I left a detailed message with instructions. I also sent the patient a message through the portal since I have not been successful in contacting the patient. MS           ----- Message from Katy Roldan sent at 1/28/2020  8:33 AM CST -----  Contact: Pt  Pt states he received a missed call and asked that nurse return his call at ([phone])

## 2020-01-28 NOTE — TELEPHONE ENCOUNTER
Spoke to patient regarding labs. I informed him to follow up with his PCP. [facility] and [facility] with [facility] on 02/19/2020. Patient stated that he'd speak to his PCP regarding the labs. The patient did give me a [facility] for [facility], [phone].  Patient verbalized understanding and was grateful for the call.         ----- Message from Carrie Malone sent at 1/28/2020 10:32 AM CST -----  Contact: Patient`  Patient is returning a call, please call them back at [phone]. Thank you

## 2020-01-28 NOTE — TELEPHONE ENCOUNTER
I left a Vm for the patient regarding a callback for his labs. Pt had a secure voicemail so I left detailed instructions along with our callback number.

## 2021-02-01 ENCOUNTER — OFFICE VISIT (OUTPATIENT)
Dept: ORTHOPEDICS | Facility: CLINIC | Age: 41
End: 2021-02-01
Payer: OTHER GOVERNMENT

## 2021-02-01 DIAGNOSIS — M25.562 CHRONIC PAIN OF LEFT KNEE: Primary | ICD-10-CM

## 2021-02-01 DIAGNOSIS — G89.29 CHRONIC PAIN OF LEFT KNEE: Primary | ICD-10-CM

## 2021-02-01 PROCEDURE — 99214 PR OFFICE/OUTPT VISIT, EST, LEVL IV, 30-39 MIN: ICD-10-PCS | Mod: 25,S$PBB,, | Performed by: ORTHOPAEDIC SURGERY

## 2021-02-01 PROCEDURE — 99999 PR PBB SHADOW E&M-EST. PATIENT-LVL II: ICD-10-PCS | Mod: PBBFAC,,, | Performed by: ORTHOPAEDIC SURGERY

## 2021-02-01 PROCEDURE — 99999 PR PBB SHADOW E&M-EST. PATIENT-LVL II: CPT | Mod: PBBFAC,,, | Performed by: ORTHOPAEDIC SURGERY

## 2021-02-01 PROCEDURE — 99214 OFFICE O/P EST MOD 30 MIN: CPT | Mod: 25,S$PBB,, | Performed by: ORTHOPAEDIC SURGERY

## 2021-02-01 PROCEDURE — 20610 LARGE JOINT ASPIRATION/INJECTION: L KNEE: ICD-10-PCS | Mod: S$PBB,LT,, | Performed by: ORTHOPAEDIC SURGERY

## 2021-02-01 PROCEDURE — 99212 OFFICE O/P EST SF 10 MIN: CPT | Mod: PBBFAC,25 | Performed by: ORTHOPAEDIC SURGERY

## 2021-02-01 PROCEDURE — 20610 DRAIN/INJ JOINT/BURSA W/O US: CPT | Mod: PBBFAC | Performed by: ORTHOPAEDIC SURGERY

## 2021-02-01 RX ORDER — METHYLPREDNISOLONE ACETATE 80 MG/ML
80 INJECTION, SUSPENSION INTRA-ARTICULAR; INTRALESIONAL; INTRAMUSCULAR; SOFT TISSUE
Status: DISCONTINUED | OUTPATIENT
Start: 2021-02-01 | End: 2021-02-01 | Stop reason: HOSPADM

## 2021-02-01 RX ADMIN — METHYLPREDNISOLONE ACETATE 80 MG: 80 INJECTION, SUSPENSION INTRALESIONAL; INTRAMUSCULAR; INTRASYNOVIAL; SOFT TISSUE at 10:02

## 2021-02-02 ENCOUNTER — TELEPHONE (OUTPATIENT)
Dept: ORTHOPEDICS | Facility: CLINIC | Age: 41
End: 2021-02-02

## 2021-02-08 ENCOUNTER — PATIENT MESSAGE (OUTPATIENT)
Dept: ORTHOPEDICS | Facility: CLINIC | Age: 41
End: 2021-02-08

## 2021-03-15 ENCOUNTER — OFFICE VISIT (OUTPATIENT)
Dept: ORTHOPEDICS | Facility: CLINIC | Age: 41
End: 2021-03-15
Payer: OTHER GOVERNMENT

## 2021-03-15 DIAGNOSIS — M23.204 DEGENERATIVE TEAR OF MEDIAL MENISCUS OF LEFT KNEE: ICD-10-CM

## 2021-03-15 DIAGNOSIS — M25.561 RIGHT KNEE PAIN, UNSPECIFIED CHRONICITY: Primary | ICD-10-CM

## 2021-03-15 DIAGNOSIS — M17.12 ARTHRITIS OF LEFT KNEE: ICD-10-CM

## 2021-03-15 PROCEDURE — 99999 PR PBB SHADOW E&M-EST. PATIENT-LVL II: ICD-10-PCS | Mod: PBBFAC,,, | Performed by: ORTHOPAEDIC SURGERY

## 2021-03-15 PROCEDURE — 99214 OFFICE O/P EST MOD 30 MIN: CPT | Mod: S$PBB,,, | Performed by: ORTHOPAEDIC SURGERY

## 2021-03-15 PROCEDURE — 99212 OFFICE O/P EST SF 10 MIN: CPT | Mod: PBBFAC | Performed by: ORTHOPAEDIC SURGERY

## 2021-03-15 PROCEDURE — 99999 PR PBB SHADOW E&M-EST. PATIENT-LVL II: CPT | Mod: PBBFAC,,, | Performed by: ORTHOPAEDIC SURGERY

## 2021-03-15 PROCEDURE — 99214 PR OFFICE/OUTPT VISIT, EST, LEVL IV, 30-39 MIN: ICD-10-PCS | Mod: S$PBB,,, | Performed by: ORTHOPAEDIC SURGERY

## 2021-03-26 ENCOUNTER — PATIENT MESSAGE (OUTPATIENT)
Dept: ORTHOPEDICS | Facility: CLINIC | Age: 41
End: 2021-03-26

## 2021-03-26 ENCOUNTER — TELEPHONE (OUTPATIENT)
Dept: ORTHOPEDICS | Facility: CLINIC | Age: 41
End: 2021-03-26

## 2021-06-08 ENCOUNTER — OFFICE VISIT (OUTPATIENT)
Dept: ORTHOPEDICS | Facility: CLINIC | Age: 41
End: 2021-06-08
Payer: OTHER GOVERNMENT

## 2021-06-08 VITALS
HEART RATE: 65 BPM | BODY MASS INDEX: 29.16 KG/M2 | SYSTOLIC BLOOD PRESSURE: 140 MMHG | WEIGHT: 220 LBS | HEIGHT: 73 IN | DIASTOLIC BLOOD PRESSURE: 93 MMHG

## 2021-06-08 DIAGNOSIS — M17.12 PRIMARY OSTEOARTHRITIS OF LEFT KNEE: ICD-10-CM

## 2021-06-08 DIAGNOSIS — M23.204 DEGENERATIVE TEAR OF MEDIAL MENISCUS OF LEFT KNEE: ICD-10-CM

## 2021-06-08 DIAGNOSIS — M25.562 LEFT KNEE PAIN, UNSPECIFIED CHRONICITY: ICD-10-CM

## 2021-06-08 DIAGNOSIS — M22.42 PATELLOFEMORAL CHONDROSIS OF LEFT KNEE: Primary | ICD-10-CM

## 2021-06-08 PROCEDURE — 99213 OFFICE O/P EST LOW 20 MIN: CPT | Mod: PBBFAC,25 | Performed by: FAMILY MEDICINE

## 2021-06-08 PROCEDURE — 99214 OFFICE O/P EST MOD 30 MIN: CPT | Mod: S$PBB,25,, | Performed by: FAMILY MEDICINE

## 2021-06-08 PROCEDURE — 99999 PR PBB SHADOW E&M-EST. PATIENT-LVL III: ICD-10-PCS | Mod: PBBFAC,,, | Performed by: FAMILY MEDICINE

## 2021-06-08 PROCEDURE — 20610 DRAIN/INJ JOINT/BURSA W/O US: CPT | Mod: PBBFAC | Performed by: FAMILY MEDICINE

## 2021-06-08 PROCEDURE — 20610 LARGE JOINT ASPIRATION/INJECTION: L KNEE: ICD-10-PCS | Mod: S$PBB,LT,, | Performed by: FAMILY MEDICINE

## 2021-06-08 PROCEDURE — 99214 PR OFFICE/OUTPT VISIT, EST, LEVL IV, 30-39 MIN: ICD-10-PCS | Mod: S$PBB,25,, | Performed by: FAMILY MEDICINE

## 2021-06-08 PROCEDURE — 99999 PR PBB SHADOW E&M-EST. PATIENT-LVL III: CPT | Mod: PBBFAC,,, | Performed by: FAMILY MEDICINE

## 2021-06-08 RX ADMIN — Medication 20 MG: at 08:06

## 2021-06-18 ENCOUNTER — OFFICE VISIT (OUTPATIENT)
Dept: ORTHOPEDICS | Facility: CLINIC | Age: 41
End: 2021-06-18
Payer: OTHER GOVERNMENT

## 2021-06-18 VITALS
HEIGHT: 73 IN | BODY MASS INDEX: 30.48 KG/M2 | HEART RATE: 60 BPM | WEIGHT: 230 LBS | RESPIRATION RATE: 20 BRPM | SYSTOLIC BLOOD PRESSURE: 128 MMHG | DIASTOLIC BLOOD PRESSURE: 89 MMHG

## 2021-06-18 DIAGNOSIS — M17.12 PRIMARY OSTEOARTHRITIS OF LEFT KNEE: Primary | ICD-10-CM

## 2021-06-18 PROCEDURE — 99999 PR PBB SHADOW E&M-EST. PATIENT-LVL III: ICD-10-PCS | Mod: PBBFAC,,, | Performed by: FAMILY MEDICINE

## 2021-06-18 PROCEDURE — 99499 UNLISTED E&M SERVICE: CPT | Mod: S$PBB,,, | Performed by: FAMILY MEDICINE

## 2021-06-18 PROCEDURE — 99999 PR PBB SHADOW E&M-EST. PATIENT-LVL III: CPT | Mod: PBBFAC,,, | Performed by: FAMILY MEDICINE

## 2021-06-18 PROCEDURE — 99499 NO LOS: ICD-10-PCS | Mod: S$PBB,,, | Performed by: FAMILY MEDICINE

## 2021-06-18 PROCEDURE — 20610 LARGE JOINT ASPIRATION/INJECTION: L KNEE: ICD-10-PCS | Mod: S$PBB,LT,, | Performed by: FAMILY MEDICINE

## 2021-06-18 PROCEDURE — 99213 OFFICE O/P EST LOW 20 MIN: CPT | Mod: PBBFAC,25 | Performed by: FAMILY MEDICINE

## 2021-06-18 PROCEDURE — 20610 DRAIN/INJ JOINT/BURSA W/O US: CPT | Mod: PBBFAC | Performed by: FAMILY MEDICINE

## 2021-06-18 RX ADMIN — Medication 20 MG: at 08:06

## 2021-06-24 ENCOUNTER — OFFICE VISIT (OUTPATIENT)
Dept: ORTHOPEDICS | Facility: CLINIC | Age: 41
End: 2021-06-24
Payer: OTHER GOVERNMENT

## 2021-06-24 VITALS — WEIGHT: 229.94 LBS | BODY MASS INDEX: 30.47 KG/M2 | HEIGHT: 73 IN

## 2021-06-24 DIAGNOSIS — M17.12 PRIMARY OSTEOARTHRITIS OF LEFT KNEE: Primary | ICD-10-CM

## 2021-06-24 PROCEDURE — 99999 PR PBB SHADOW E&M-EST. PATIENT-LVL II: CPT | Mod: PBBFAC,,, | Performed by: FAMILY MEDICINE

## 2021-06-24 PROCEDURE — 20610 DRAIN/INJ JOINT/BURSA W/O US: CPT | Mod: PBBFAC | Performed by: FAMILY MEDICINE

## 2021-06-24 PROCEDURE — 99499 UNLISTED E&M SERVICE: CPT | Mod: S$PBB,,, | Performed by: FAMILY MEDICINE

## 2021-06-24 PROCEDURE — 20610 LARGE JOINT ASPIRATION/INJECTION: L KNEE: ICD-10-PCS | Mod: S$PBB,LT,, | Performed by: FAMILY MEDICINE

## 2021-06-24 PROCEDURE — 99999 PR PBB SHADOW E&M-EST. PATIENT-LVL II: ICD-10-PCS | Mod: PBBFAC,,, | Performed by: FAMILY MEDICINE

## 2021-06-24 PROCEDURE — 99499 NO LOS: ICD-10-PCS | Mod: S$PBB,,, | Performed by: FAMILY MEDICINE

## 2021-06-24 PROCEDURE — 99212 OFFICE O/P EST SF 10 MIN: CPT | Mod: PBBFAC | Performed by: FAMILY MEDICINE

## 2021-06-24 RX ADMIN — Medication 20 MG: at 08:06

## 2024-05-01 ENCOUNTER — TELEPHONE (OUTPATIENT)
Dept: ALLERGY | Facility: CLINIC | Age: 44
End: 2024-05-01

## 2024-05-01 ENCOUNTER — LAB VISIT (OUTPATIENT)
Dept: LAB | Facility: HOSPITAL | Age: 44
End: 2024-05-01
Attending: STUDENT IN AN ORGANIZED HEALTH CARE EDUCATION/TRAINING PROGRAM
Payer: OTHER GOVERNMENT

## 2024-05-01 ENCOUNTER — OFFICE VISIT (OUTPATIENT)
Dept: ALLERGY | Facility: CLINIC | Age: 44
End: 2024-05-01
Payer: OTHER GOVERNMENT

## 2024-05-01 ENCOUNTER — PATIENT MESSAGE (OUTPATIENT)
Dept: ALLERGY | Facility: CLINIC | Age: 44
End: 2024-05-01

## 2024-05-01 VITALS
OXYGEN SATURATION: 96 % | HEART RATE: 66 BPM | DIASTOLIC BLOOD PRESSURE: 74 MMHG | BODY MASS INDEX: 32.9 KG/M2 | SYSTOLIC BLOOD PRESSURE: 145 MMHG | WEIGHT: 248.25 LBS | RESPIRATION RATE: 20 BRPM | TEMPERATURE: 98 F | HEIGHT: 73 IN

## 2024-05-01 DIAGNOSIS — J30.9 CHRONIC ALLERGIC RHINITIS: Primary | ICD-10-CM

## 2024-05-01 DIAGNOSIS — J30.9 CHRONIC ALLERGIC RHINITIS: ICD-10-CM

## 2024-05-01 DIAGNOSIS — R09.82 PND (POST-NASAL DRIP): ICD-10-CM

## 2024-05-01 PROCEDURE — 99999 PR PBB SHADOW E&M-EST. PATIENT-LVL IV: CPT | Mod: PBBFAC,,, | Performed by: STUDENT IN AN ORGANIZED HEALTH CARE EDUCATION/TRAINING PROGRAM

## 2024-05-01 PROCEDURE — 99214 OFFICE O/P EST MOD 30 MIN: CPT | Mod: PBBFAC,25 | Performed by: STUDENT IN AN ORGANIZED HEALTH CARE EDUCATION/TRAINING PROGRAM

## 2024-05-01 PROCEDURE — 95004 PERQ TESTS W/ALRGNC XTRCS: CPT | Mod: S$PBB,,, | Performed by: STUDENT IN AN ORGANIZED HEALTH CARE EDUCATION/TRAINING PROGRAM

## 2024-05-01 PROCEDURE — 95004 PERQ TESTS W/ALRGNC XTRCS: CPT | Mod: PBBFAC | Performed by: STUDENT IN AN ORGANIZED HEALTH CARE EDUCATION/TRAINING PROGRAM

## 2024-05-01 PROCEDURE — 36415 COLL VENOUS BLD VENIPUNCTURE: CPT | Performed by: STUDENT IN AN ORGANIZED HEALTH CARE EDUCATION/TRAINING PROGRAM

## 2024-05-01 PROCEDURE — 82785 ASSAY OF IGE: CPT | Performed by: STUDENT IN AN ORGANIZED HEALTH CARE EDUCATION/TRAINING PROGRAM

## 2024-05-01 PROCEDURE — 99204 OFFICE O/P NEW MOD 45 MIN: CPT | Mod: 25,S$PBB,, | Performed by: STUDENT IN AN ORGANIZED HEALTH CARE EDUCATION/TRAINING PROGRAM

## 2024-05-01 RX ORDER — AZELASTINE 1 MG/ML
1 SPRAY, METERED NASAL 2 TIMES DAILY
Qty: 30 ML | Refills: 11 | Status: SHIPPED | OUTPATIENT
Start: 2024-05-01 | End: 2024-05-01

## 2024-05-01 RX ORDER — AZELASTINE 1 MG/ML
1 SPRAY, METERED NASAL 2 TIMES DAILY
Qty: 90 ML | Refills: 2 | Status: SHIPPED | OUTPATIENT
Start: 2024-05-01 | End: 2025-05-01

## 2024-05-01 RX ORDER — FLUTICASONE PROPIONATE 50 MCG
1 SPRAY, SUSPENSION (ML) NASAL 2 TIMES DAILY
Qty: 48 G | Refills: 2 | Status: SHIPPED | OUTPATIENT
Start: 2024-05-01 | End: 2025-05-01

## 2024-05-01 RX ORDER — FLUTICASONE PROPIONATE 50 MCG
1 SPRAY, SUSPENSION (ML) NASAL 2 TIMES DAILY
Qty: 16 G | Refills: 11 | Status: SHIPPED | OUTPATIENT
Start: 2024-05-01 | End: 2024-05-01

## 2024-05-01 NOTE — TELEPHONE ENCOUNTER
Message sent to .      ---- Message from Melba Bojorquez MA sent at 5/1/2024  3:49 PM CDT -----  Regarding: FW: follow up    ----- Message -----  From: Radha Friedman  Sent: 5/1/2024   3:46 PM CDT  To: [facility] Staff  Subject: follow up                                        Name of Who is Calling:  Pt called         What is the request in detail:  The pt just left the office and he want to know what he can do about blurry vision and scratch eyes. Please advise         Can the clinic reply by [facility]:  No         What Number to Call Back if not in [facility]: Telephone Information:  Mobile          [phone]

## 2024-05-01 NOTE — ASSESSMENT & PLAN NOTE
- Not controlled at this time   - Ordered Flonase 1 SEN BID   - Ordered Astelin 1 SEN BID   - Educated on proper use of intranasal sprays   - [facility]   - Will continue to monitor and reassess

## 2024-05-07 LAB
A ALTERNATA IGE QN: <0.1 KU/L
A FUMIGATUS IGE QN: <0.1 KU/L
ALLERGEN BOXELDER MAPLE TREE IGE: 1.18 KU/L
ALLERGEN MAPLE (BOX ELDER) CLASS: ABNORMAL
ALLERGEN MULBERRY CLASS: ABNORMAL
ALLERGEN MULBERRY TREE IGE: 1.03 KU/L
ALLERGEN PIGWEED IGE: 1.13 KU/L
ALLERGEN WALNUT TREE IGE: 1.18 KU/L
BERMUDA GRASS IGE QN: 1.3 KU/L
C HERBARUM IGE QN: <0.1 KU/L
CAT DANDER IGE QN: <0.1 KU/L
COMMON PIGWEED CLASS: ABNORMAL
COMMON RAGWEED IGE QN: 1.16 KU/L
D FARINAE IGE QN: 0.49 KU/L
D PTERONYSS IGE QN: 0.13 KU/L
DEPRECATED A ALTERNATA IGE RAST QL: ABNORMAL
DEPRECATED A FUMIGATUS IGE RAST QL: ABNORMAL
DEPRECATED BERMUDA GRASS IGE RAST QL: ABNORMAL
DEPRECATED C HERBARUM IGE RAST QL: ABNORMAL
DEPRECATED CAT DANDER IGE RAST QL: ABNORMAL
DEPRECATED COMMON RAGWEED IGE RAST QL: ABNORMAL
DEPRECATED D FARINAE IGE RAST QL: ABNORMAL
DEPRECATED D PTERONYSS IGE RAST QL: ABNORMAL
DEPRECATED DOG DANDER IGE RAST QL: ABNORMAL
DEPRECATED ELDER IGE RAST QL: ABNORMAL
DEPRECATED MOUSE URINE PROT IGE RAST QL: ABNORMAL
DEPRECATED MT JUNIPER IGE RAST QL: ABNORMAL
DEPRECATED P NOTATUM IGE RAST QL: ABNORMAL
DEPRECATED PECAN/HICK TREE IGE RAST QL: ABNORMAL
DEPRECATED ROACH IGE RAST QL: ABNORMAL
DEPRECATED SILVER BIRCH IGE RAST QL: ABNORMAL
DEPRECATED TIMOTHY IGE RAST QL: ABNORMAL
DEPRECATED WHITE ELM IGE RAST QL: ABNORMAL
DEPRECATED WHITE OAK IGE RAST QL: ABNORMAL
DOG DANDER IGE QN: 0.12 KU/L
ELDER IGE QN: 1.09 KU/L
IGE: 74 IU/ML
MOUSE URINE PROT IGE QN: <0.1 KU/L
MT JUNIPER IGE QN: 0.98 KU/L
P NOTATUM IGE QN: <0.1 KU/L
PECAN/HICK TREE IGE QN: 1.14 KU/L
RAST ALLERGEN INTERPRETATION: ABNORMAL
ROACH IGE QN: 0.83 KU/L
SILVER BIRCH IGE QN: 1.18 KU/L
TIMOTHY IGE QN: 1.24 KU/L
WALNUT TREE CLASS: ABNORMAL
WHITE ELM IGE QN: 1.17 KU/L
WHITE OAK IGE QN: 1.16 KU/L